# Patient Record
Sex: MALE | Race: WHITE | NOT HISPANIC OR LATINO | URBAN - METROPOLITAN AREA
[De-identification: names, ages, dates, MRNs, and addresses within clinical notes are randomized per-mention and may not be internally consistent; named-entity substitution may affect disease eponyms.]

---

## 2020-12-07 ENCOUNTER — PREPPED CHART (OUTPATIENT)
Dept: URBAN - METROPOLITAN AREA CLINIC 37 | Facility: CLINIC | Age: 70
End: 2020-12-07

## 2021-07-19 ENCOUNTER — FOLLOW UP (OUTPATIENT)
Dept: URBAN - METROPOLITAN AREA CLINIC 37 | Facility: CLINIC | Age: 71
End: 2021-07-19

## 2021-07-19 DIAGNOSIS — H25.11: ICD-10-CM

## 2021-07-19 DIAGNOSIS — H33.001: ICD-10-CM

## 2021-07-19 DIAGNOSIS — Z96.1: ICD-10-CM

## 2021-07-19 PROCEDURE — 92014 COMPRE OPH EXAM EST PT 1/>: CPT

## 2021-07-19 ASSESSMENT — KERATOMETRY
OD_AXISANGLE2_DEGREES: 085
OS_AXISANGLE2_DEGREES: 157
OD_K1POWER_DIOPTERS: 45.00
OD_AXISANGLE_DEGREES: 175
OS_K2POWER_DIOPTERS: 45.50
OD_K2POWER_DIOPTERS: 46.00
OS_K1POWER_DIOPTERS: 45.25
OS_AXISANGLE_DEGREES: 67

## 2021-07-19 ASSESSMENT — VISUAL ACUITY
OS_SC: 20/25-1
OD_CC: 20/25-2

## 2021-07-19 ASSESSMENT — TONOMETRY
OD_IOP_MMHG: 17.0
OS_IOP_MMHG: 19.0

## 2021-10-26 ENCOUNTER — COMPLETE SKIN EXAM (OUTPATIENT)
Dept: URBAN - METROPOLITAN AREA CLINIC 24 | Facility: CLINIC | Age: 71
Setting detail: DERMATOLOGY
End: 2021-10-26

## 2021-10-26 DIAGNOSIS — C44.629 SQUAMOUS CELL CARCINOMA OF SKIN OF LEFT UPPER LIMB, INCLUDING SHOULDER: ICD-10-CM

## 2021-10-26 PROCEDURE — 99213 OFFICE O/P EST LOW 20 MIN: CPT

## 2022-01-31 ENCOUNTER — ESTABLISHED COMPREHENSIVE EXAM (OUTPATIENT)
Dept: URBAN - METROPOLITAN AREA CLINIC 37 | Facility: CLINIC | Age: 72
End: 2022-01-31

## 2022-01-31 DIAGNOSIS — H33.001: ICD-10-CM

## 2022-01-31 DIAGNOSIS — H25.11: ICD-10-CM

## 2022-01-31 DIAGNOSIS — Z96.1: ICD-10-CM

## 2022-01-31 PROCEDURE — 92014 COMPRE OPH EXAM EST PT 1/>: CPT

## 2022-01-31 ASSESSMENT — KERATOMETRY
OS_K1POWER_DIOPTERS: 45.25
OD_AXISANGLE2_DEGREES: 87
OS_AXISANGLE_DEGREES: 66
OD_AXISANGLE_DEGREES: 177
OS_K2POWER_DIOPTERS: 45.50
OD_K1POWER_DIOPTERS: 45.00
OD_K2POWER_DIOPTERS: 46.25
OS_AXISANGLE2_DEGREES: 156

## 2022-01-31 ASSESSMENT — VISUAL ACUITY
OS_SC: 20/25-1
OU_CC: 20/25
OD_PH: 20/70-1
OD_CC: 20/400

## 2022-01-31 ASSESSMENT — TONOMETRY
OS_IOP_MMHG: 17
OD_IOP_MMHG: 17

## 2022-02-28 ENCOUNTER — CATARACT CONSULTATION (OUTPATIENT)
Dept: URBAN - METROPOLITAN AREA CLINIC 47 | Facility: CLINIC | Age: 72
End: 2022-02-28

## 2022-02-28 DIAGNOSIS — H25.11: ICD-10-CM

## 2022-02-28 PROCEDURE — 92014 COMPRE OPH EXAM EST PT 1/>: CPT

## 2022-02-28 ASSESSMENT — KERATOMETRY
OS_K2POWER_DIOPTERS: 45.50
OD_K2POWER_DIOPTERS: 46.25
OS_AXISANGLE2_DEGREES: 156
OS_AXISANGLE_DEGREES: 66
OS_K1POWER_DIOPTERS: 45.25
OD_K1POWER_DIOPTERS: 45.00
OD_AXISANGLE_DEGREES: 177
OD_AXISANGLE2_DEGREES: 87

## 2022-02-28 ASSESSMENT — VISUAL ACUITY
OU_CC: 20/20
OD_PH: 20/60
OS_SC: 20/25
OD_CC: 20/80

## 2022-04-01 ENCOUNTER — DIAGNOSTICS ONLY (OUTPATIENT)
Dept: URBAN - METROPOLITAN AREA CLINIC 47 | Facility: CLINIC | Age: 72
End: 2022-04-01

## 2022-04-01 DIAGNOSIS — H25.11: ICD-10-CM

## 2022-04-01 PROCEDURE — 92136 OPHTHALMIC BIOMETRY: CPT

## 2022-04-01 ASSESSMENT — KERATOMETRY
OS_AXISANGLE2_DEGREES: 156
OS_AXISANGLE_DEGREES: 66
OD_AXISANGLE2_DEGREES: 87
OS_K1POWER_DIOPTERS: 45.25
OD_AXISANGLE_DEGREES: 177
OD_K2POWER_DIOPTERS: 46.25
OD_K1POWER_DIOPTERS: 45.00
OS_K2POWER_DIOPTERS: 45.50

## 2022-04-06 ENCOUNTER — SURGERY/PROCEDURE (OUTPATIENT)
Dept: SURGICAL CENTER 2 | Facility: SURGICAL CENTER | Age: 72
End: 2022-04-06

## 2022-04-06 DIAGNOSIS — H25.11: ICD-10-CM

## 2022-04-06 PROCEDURE — 66984 XCAPSL CTRC RMVL W/O ECP: CPT

## 2022-04-07 ENCOUNTER — 1 DAY POST-OP (OUTPATIENT)
Dept: URBAN - METROPOLITAN AREA CLINIC 47 | Facility: CLINIC | Age: 72
End: 2022-04-07

## 2022-04-07 DIAGNOSIS — Z96.1: ICD-10-CM

## 2022-04-07 PROCEDURE — 99024 POSTOP FOLLOW-UP VISIT: CPT

## 2022-04-07 ASSESSMENT — KERATOMETRY
OS_AXISANGLE2_DEGREES: 156
OS_AXISANGLE_DEGREES: 66
OD_K1POWER_DIOPTERS: 45.00
OS_K2POWER_DIOPTERS: 45.50
OS_K2POWER_DIOPTERS: 45.50
OD_K1POWER_DIOPTERS: 45.00
OS_AXISANGLE_DEGREES: 66
OS_K1POWER_DIOPTERS: 45.25
OS_AXISANGLE2_DEGREES: 156
OD_AXISANGLE2_DEGREES: 092
OD_K1POWER_DIOPTERS: 44.75
OD_AXISANGLE_DEGREES: 2
OS_K1POWER_DIOPTERS: 45.25
OD_AXISANGLE2_DEGREES: 092
OD_AXISANGLE2_DEGREES: 87
OD_AXISANGLE2_DEGREES: 87
OD_K2POWER_DIOPTERS: 46.25
OD_K1POWER_DIOPTERS: 44.75
OD_K2POWER_DIOPTERS: 46.25
OD_K2POWER_DIOPTERS: 45.50
OD_AXISANGLE_DEGREES: 177
OD_AXISANGLE_DEGREES: 177
OD_AXISANGLE_DEGREES: 2
OD_K2POWER_DIOPTERS: 45.50

## 2022-04-07 ASSESSMENT — VISUAL ACUITY
OD_SC: 20/20
OU_SC: J1

## 2022-04-07 ASSESSMENT — TONOMETRY: OD_IOP_MMHG: 23

## 2022-07-11 ENCOUNTER — FOLLOW UP (OUTPATIENT)
Dept: URBAN - METROPOLITAN AREA CLINIC 37 | Facility: CLINIC | Age: 72
End: 2022-07-11

## 2022-07-11 DIAGNOSIS — H33.001: ICD-10-CM

## 2022-07-11 DIAGNOSIS — Z96.1: ICD-10-CM

## 2022-07-11 PROCEDURE — 92014 COMPRE OPH EXAM EST PT 1/>: CPT

## 2022-07-11 ASSESSMENT — KERATOMETRY
OD_K2POWER_DIOPTERS: 46.25
OS_AXISANGLE_DEGREES: 66
OS_K1POWER_DIOPTERS: 45.00
OD_AXISANGLE_DEGREES: 2
OD_AXISANGLE_DEGREES: 177
OD_AXISANGLE2_DEGREES: 87
OD_AXISANGLE2_DEGREES: 092
OD_AXISANGLE2_DEGREES: 80
OD_K2POWER_DIOPTERS: 45.50
OD_AXISANGLE_DEGREES: 170
OS_AXISANGLE_DEGREES: 79
OS_K1POWER_DIOPTERS: 45.25
OD_K1POWER_DIOPTERS: 44.75
OS_AXISANGLE2_DEGREES: 169
OD_K1POWER_DIOPTERS: 45.00
OS_AXISANGLE2_DEGREES: 156
OS_K2POWER_DIOPTERS: 45.50

## 2022-07-11 ASSESSMENT — TONOMETRY
OD_IOP_MMHG: 17
OS_IOP_MMHG: 18

## 2022-07-11 ASSESSMENT — VISUAL ACUITY
OS_PH: 20/25+2
OD_SC: 20/80+1
OU_SC: 20/30
OD_PH: 20/30+1
OS_SC: 20/40

## 2022-10-27 ENCOUNTER — APPOINTMENT (OUTPATIENT)
Dept: URBAN - METROPOLITAN AREA CLINIC 193 | Age: 72
Setting detail: DERMATOLOGY
End: 2022-11-03

## 2022-10-27 DIAGNOSIS — L57.8 OTHER SKIN CHANGES DUE TO CHRONIC EXPOSURE TO NONIONIZING RADIATION: ICD-10-CM

## 2022-10-27 DIAGNOSIS — B07.8 OTHER VIRAL WARTS: ICD-10-CM

## 2022-10-27 DIAGNOSIS — Z71.89 OTHER SPECIFIED COUNSELING: ICD-10-CM

## 2022-10-27 DIAGNOSIS — L82.1 OTHER SEBORRHEIC KERATOSIS: ICD-10-CM

## 2022-10-27 DIAGNOSIS — L81.4 OTHER MELANIN HYPERPIGMENTATION: ICD-10-CM

## 2022-10-27 DIAGNOSIS — D22 MELANOCYTIC NEVI: ICD-10-CM

## 2022-10-27 DIAGNOSIS — Z85.828 PERSONAL HISTORY OF OTHER MALIGNANT NEOPLASM OF SKIN: ICD-10-CM

## 2022-10-27 PROBLEM — D22.5 MELANOCYTIC NEVI OF TRUNK: Status: ACTIVE | Noted: 2022-10-27

## 2022-10-27 PROCEDURE — OTHER SHAVE REMOVAL (NO PATHOLOGY): OTHER

## 2022-10-27 PROCEDURE — 11305 SHAVE SKIN LESION 0.5 CM/<: CPT

## 2022-10-27 PROCEDURE — 99213 OFFICE O/P EST LOW 20 MIN: CPT | Mod: 25

## 2022-10-27 PROCEDURE — OTHER COUNSELING: OTHER

## 2022-10-27 ASSESSMENT — LOCATION DETAILED DESCRIPTION DERM
LOCATION DETAILED: LEFT MEDIAL BREAST 8-9:00 REGION
LOCATION DETAILED: MIDDLE STERNUM
LOCATION DETAILED: EPIGASTRIC SKIN
LOCATION DETAILED: RIGHT MEDIAL SUPERIOR CHEST
LOCATION DETAILED: LEFT CENTRAL POSTAURICULAR SKIN

## 2022-10-27 ASSESSMENT — LOCATION ZONE DERM
LOCATION ZONE: TRUNK
LOCATION ZONE: SCALP

## 2022-10-27 ASSESSMENT — LOCATION SIMPLE DESCRIPTION DERM
LOCATION SIMPLE: CHEST
LOCATION SIMPLE: LEFT BREAST
LOCATION SIMPLE: ABDOMEN
LOCATION SIMPLE: SCALP

## 2022-10-27 NOTE — PROCEDURE: SHAVE REMOVAL (NO PATHOLOGY)
Medical Necessity Information: It is in your best interest to select a reason for this procedure from the list below. All of these items fulfill various CMS LCD requirements except the new and changing color options.
Include Z78.9 (Other Specified Conditions Influencing Health Status) As An Associated Diagnosis?: No
Medical Necessity Clause: This procedure was medically necessary because the lesion that was treated was:
Detail Level: Detailed
Size Of Lesion In Cm: 0.5
X Size Of Lesion In Cm (Optional): 0
Anesthesia Type: 0.5% lidocaine with 1:200,000 epinephrine and a 1:10 solution of 8.4% sodium bicarbonate
Hemostasis: Electrocautery and Aluminum Chloride
Wound Care: Petrolatum
Consent was obtained from the patient. The risks and benefits to therapy were discussed in detail. Specifically, the risks of infection, scarring, bleeding, prolonged wound healing, incomplete removal, allergy to anesthesia, nerve injury and recurrence were addressed. Prior to the procedure, the treatment site was clearly identified and confirmed by the patient. All components of Universal Protocol/PAUSE Rule completed.
Post-Care Instructions: I reviewed with the patient in detail post-care instructions. Patient is to keep the biopsy site dry overnight, and then apply bacitracin twice daily until healed. Patient may apply hydrogen peroxide soaks to remove any crusting.

## 2023-02-06 ENCOUNTER — FOLLOW UP (OUTPATIENT)
Dept: URBAN - METROPOLITAN AREA CLINIC 37 | Facility: CLINIC | Age: 73
End: 2023-02-06

## 2023-02-06 DIAGNOSIS — H33.001: ICD-10-CM

## 2023-02-06 DIAGNOSIS — Z96.1: ICD-10-CM

## 2023-02-06 PROCEDURE — 92014 COMPRE OPH EXAM EST PT 1/>: CPT

## 2023-02-06 ASSESSMENT — KERATOMETRY
OS_AXISANGLE_DEGREES: 79
OD_AXISANGLE2_DEGREES: 80
OS_K2POWER_DIOPTERS: 45.50
OS_AXISANGLE2_DEGREES: 169
OD_K2POWER_DIOPTERS: 46.25
OD_K1POWER_DIOPTERS: 45.00
OS_K1POWER_DIOPTERS: 45.00
OD_AXISANGLE_DEGREES: 170

## 2023-02-06 ASSESSMENT — VISUAL ACUITY
OS_SC: 20/20
OD_PH: 20/40
OD_SC: 20/60-1

## 2023-08-09 ENCOUNTER — FOLLOW UP (OUTPATIENT)
Dept: URBAN - METROPOLITAN AREA CLINIC 37 | Facility: CLINIC | Age: 73
End: 2023-08-09

## 2023-08-09 DIAGNOSIS — H33.001: ICD-10-CM

## 2023-08-09 DIAGNOSIS — Z96.1: ICD-10-CM

## 2023-08-09 PROCEDURE — 92014 COMPRE OPH EXAM EST PT 1/>: CPT

## 2023-08-09 ASSESSMENT — TONOMETRY
OD_IOP_MMHG: 13
OS_IOP_MMHG: 16

## 2023-08-09 ASSESSMENT — VISUAL ACUITY
OS_SC: 20/20
OD_SC: 20/30+1

## 2023-09-19 ENCOUNTER — APPOINTMENT (OUTPATIENT)
Dept: URBAN - METROPOLITAN AREA CLINIC 193 | Age: 73
Setting detail: DERMATOLOGY
End: 2023-09-19

## 2023-09-19 DIAGNOSIS — L82.1 OTHER SEBORRHEIC KERATOSIS: ICD-10-CM

## 2023-09-19 DIAGNOSIS — D485 NEOPLASM OF UNCERTAIN BEHAVIOR OF SKIN: ICD-10-CM

## 2023-09-19 DIAGNOSIS — L57.0 ACTINIC KERATOSIS: ICD-10-CM

## 2023-09-19 DIAGNOSIS — D22 MELANOCYTIC NEVI: ICD-10-CM

## 2023-09-19 DIAGNOSIS — Z71.89 OTHER SPECIFIED COUNSELING: ICD-10-CM

## 2023-09-19 DIAGNOSIS — L57.8 OTHER SKIN CHANGES DUE TO CHRONIC EXPOSURE TO NONIONIZING RADIATION: ICD-10-CM

## 2023-09-19 DIAGNOSIS — Z85.828 PERSONAL HISTORY OF OTHER MALIGNANT NEOPLASM OF SKIN: ICD-10-CM

## 2023-09-19 DIAGNOSIS — L81.4 OTHER MELANIN HYPERPIGMENTATION: ICD-10-CM

## 2023-09-19 PROBLEM — D22.5 MELANOCYTIC NEVI OF TRUNK: Status: ACTIVE | Noted: 2023-09-19

## 2023-09-19 PROBLEM — D48.5 NEOPLASM OF UNCERTAIN BEHAVIOR OF SKIN: Status: ACTIVE | Noted: 2023-09-19

## 2023-09-19 PROCEDURE — OTHER SHAVE REMOVAL: OTHER

## 2023-09-19 PROCEDURE — OTHER MIPS QUALITY: OTHER

## 2023-09-19 PROCEDURE — 11302 SHAVE SKIN LESION 1.1-2.0 CM: CPT

## 2023-09-19 PROCEDURE — 17000 DESTRUCT PREMALG LESION: CPT | Mod: 59

## 2023-09-19 PROCEDURE — 99213 OFFICE O/P EST LOW 20 MIN: CPT | Mod: 25

## 2023-09-19 PROCEDURE — 17003 DESTRUCT PREMALG LES 2-14: CPT

## 2023-09-19 PROCEDURE — OTHER COUNSELING: OTHER

## 2023-09-19 PROCEDURE — OTHER LIQUID NITROGEN: OTHER

## 2023-09-19 ASSESSMENT — LOCATION ZONE DERM
LOCATION ZONE: FACE
LOCATION ZONE: TRUNK
LOCATION ZONE: ARM

## 2023-09-19 ASSESSMENT — LOCATION DETAILED DESCRIPTION DERM
LOCATION DETAILED: EPIGASTRIC SKIN
LOCATION DETAILED: RIGHT LATERAL INFERIOR CHEST
LOCATION DETAILED: RIGHT MEDIAL SUPERIOR CHEST
LOCATION DETAILED: LEFT POSTERIOR SHOULDER
LOCATION DETAILED: RIGHT LATERAL FOREHEAD
LOCATION DETAILED: LEFT MEDIAL BREAST 8-9:00 REGION
LOCATION DETAILED: MIDDLE STERNUM
LOCATION DETAILED: RIGHT INFERIOR TEMPLE

## 2023-09-19 ASSESSMENT — LOCATION SIMPLE DESCRIPTION DERM
LOCATION SIMPLE: ABDOMEN
LOCATION SIMPLE: CHEST
LOCATION SIMPLE: RIGHT FOREHEAD
LOCATION SIMPLE: LEFT SHOULDER
LOCATION SIMPLE: LEFT BREAST
LOCATION SIMPLE: RIGHT TEMPLE

## 2023-09-19 NOTE — PROCEDURE: LIQUID NITROGEN
Detail Level: Detailed
Application Tool (Optional): Cry-AC
Show Applicator Variable?: Yes
Duration Of Freeze Thaw-Cycle (Seconds): 3
Post-Care Instructions: I reviewed with the patient in detail post-care instructions. Patient is to wear sunprotection, and avoid picking at any of the treated lesions. Pt may apply Vaseline to crusted or scabbing areas.
Render Note In Bullet Format When Appropriate: No
Number Of Freeze-Thaw Cycles: 2 freeze-thaw cycles
Consent: The patient's consent was obtained including but not limited to risks of crusting, scabbing, blistering, scarring, darker or lighter pigmentary change, recurrence, incomplete removal and infection.

## 2023-09-19 NOTE — PROCEDURE: SHAVE REMOVAL
Medical Necessity Information: It is in your best interest to select a reason for this procedure from the list below. All of these items fulfill various CMS LCD requirements except the new and changing color options.
Medical Necessity Clause: This procedure was medically necessary because the lesion that was treated was:
Lab: [id number]
Lab Facility: [id number]
Detail Level: Detailed
Was A Bandage Applied: Yes
Size Of Lesion In Cm (Required): 1.4
Depth Of Shave: dermis
Biopsy Method: Dermablade
Anesthesia Type: 0.5% lidocaine with 1:200,000 epinephrine and a 1:10 solution of 4.2% sodium bicarbonate
Anesthesia Volume In Cc: 1
Hemostasis: Drysol
Wound Care: Petrolatum
Render Path Notes In Note?: No
Consent was obtained from the patient. The risks and benefits to therapy were discussed in detail. Specifically, the risks of infection, scarring, bleeding, prolonged wound healing, incomplete removal, allergy to anesthesia, nerve injury and recurrence were addressed. Prior to the procedure, the treatment site was clearly identified and confirmed by the patient. All components of Universal Protocol/PAUSE Rule completed.
Post-Care Instructions: I reviewed with the patient in detail post-care instructions. Patient is to keep the biopsy site dry overnight, and then apply bacitracin twice daily until healed. Patient may apply hydrogen peroxide soaks to remove any crusting.
Notification Instructions: Patient will be notified of pathology results. However, patient instructed to call the office if not contacted within 2 weeks.
Billing Type: Third-Party Bill

## 2023-09-19 NOTE — HPI: EVALUATION OF SKIN LESION(S)
Hpi Title: Evaluation of Skin Lesions
Additional History: Patient reports that he has a growth on the right chest area also states that he has a spot on the right temple area that has been rough and scaly nothing he uses helps with it.

## 2024-02-13 ENCOUNTER — FOLLOW UP (OUTPATIENT)
Dept: URBAN - METROPOLITAN AREA CLINIC 37 | Facility: CLINIC | Age: 74
End: 2024-02-13

## 2024-02-13 DIAGNOSIS — H33.001: ICD-10-CM

## 2024-02-13 DIAGNOSIS — Z96.1: ICD-10-CM

## 2024-02-13 DIAGNOSIS — H35.62: ICD-10-CM

## 2024-02-13 DIAGNOSIS — H26.491: ICD-10-CM

## 2024-02-13 PROCEDURE — 92014 COMPRE OPH EXAM EST PT 1/>: CPT

## 2024-02-13 PROCEDURE — 92250 FUNDUS PHOTOGRAPHY W/I&R: CPT

## 2024-02-13 ASSESSMENT — KERATOMETRY
OS_AXISANGLE_DEGREES: 159
OS_K1POWER_DIOPTERS: 45.25
OD_AXISANGLE_DEGREES: 174
OD_AXISANGLE2_DEGREES: 84
OD_K1POWER_DIOPTERS: 45.25
OD_K2POWER_DIOPTERS: 46.25
OS_AXISANGLE2_DEGREES: 69
OS_K2POWER_DIOPTERS: 45.50

## 2024-02-13 ASSESSMENT — VISUAL ACUITY
OD_SC: 20/80
OS_SC: 20/30
OD_PH: 20/50+1

## 2024-02-13 ASSESSMENT — TONOMETRY
OD_IOP_MMHG: 18
OS_IOP_MMHG: 18

## 2024-04-26 ENCOUNTER — PROBLEM (OUTPATIENT)
Dept: URBAN - METROPOLITAN AREA CLINIC 37 | Facility: CLINIC | Age: 74
End: 2024-04-26

## 2024-04-26 DIAGNOSIS — H33.001: ICD-10-CM

## 2024-04-26 DIAGNOSIS — Z96.1: ICD-10-CM

## 2024-04-26 DIAGNOSIS — H35.62: ICD-10-CM

## 2024-04-26 DIAGNOSIS — H26.491: ICD-10-CM

## 2024-04-26 DIAGNOSIS — H43.813: ICD-10-CM

## 2024-04-26 PROCEDURE — 92014 COMPRE OPH EXAM EST PT 1/>: CPT

## 2024-04-26 ASSESSMENT — KERATOMETRY
OS_K2POWER_DIOPTERS: 45.50
OD_AXISANGLE_DEGREES: 174
OD_AXISANGLE2_DEGREES: 84
OD_K1POWER_DIOPTERS: 45.25
OS_AXISANGLE_DEGREES: 159
OS_K1POWER_DIOPTERS: 45.25
OD_K2POWER_DIOPTERS: 46.25
OS_AXISANGLE2_DEGREES: 69

## 2024-04-26 ASSESSMENT — VISUAL ACUITY
OD_PH: 20/100
OU_SC: J2
OS_PH: 20/30+2
OS_SC: 20/60
OD_SC: 20/400

## 2024-04-26 ASSESSMENT — TONOMETRY
OS_IOP_MMHG: 18
OD_IOP_MMHG: 19

## 2024-06-04 ENCOUNTER — PROCEDURE ONLY (OUTPATIENT)
Dept: URBAN - METROPOLITAN AREA CLINIC 95 | Facility: CLINIC | Age: 74
End: 2024-06-04

## 2024-06-04 DIAGNOSIS — H26.491: ICD-10-CM

## 2024-06-04 PROCEDURE — 66821 AFTER CATARACT LASER SURGERY: CPT

## 2024-06-04 ASSESSMENT — TONOMETRY
OD_IOP_MMHG: 15
OS_IOP_MMHG: 15

## 2024-06-04 ASSESSMENT — KERATOMETRY
OS_AXISANGLE2_DEGREES: 69
OD_K1POWER_DIOPTERS: 45.25
OD_AXISANGLE2_DEGREES: 84
OS_K2POWER_DIOPTERS: 45.50
OD_AXISANGLE_DEGREES: 174
OD_K2POWER_DIOPTERS: 46.25
OS_AXISANGLE_DEGREES: 159
OS_K1POWER_DIOPTERS: 45.25

## 2024-06-04 ASSESSMENT — VISUAL ACUITY
OD_PH: 20/100-1
OS_SC: 20/50-1
OS_PH: 20/20
OD_SC: 20/200

## 2024-09-19 ENCOUNTER — APPOINTMENT (OUTPATIENT)
Dept: URBAN - METROPOLITAN AREA CLINIC 193 | Age: 74
Setting detail: DERMATOLOGY
End: 2024-09-23

## 2024-09-19 DIAGNOSIS — D22 MELANOCYTIC NEVI: ICD-10-CM

## 2024-09-19 DIAGNOSIS — Z85.828 PERSONAL HISTORY OF OTHER MALIGNANT NEOPLASM OF SKIN: ICD-10-CM

## 2024-09-19 DIAGNOSIS — L57.0 ACTINIC KERATOSIS: ICD-10-CM

## 2024-09-19 DIAGNOSIS — L57.8 OTHER SKIN CHANGES DUE TO CHRONIC EXPOSURE TO NONIONIZING RADIATION: ICD-10-CM

## 2024-09-19 DIAGNOSIS — L82.1 OTHER SEBORRHEIC KERATOSIS: ICD-10-CM

## 2024-09-19 DIAGNOSIS — L81.4 OTHER MELANIN HYPERPIGMENTATION: ICD-10-CM

## 2024-09-19 PROBLEM — D22.5 MELANOCYTIC NEVI OF TRUNK: Status: ACTIVE | Noted: 2024-09-19

## 2024-09-19 PROCEDURE — OTHER COUNSELING: OTHER

## 2024-09-19 PROCEDURE — 99213 OFFICE O/P EST LOW 20 MIN: CPT | Mod: 25

## 2024-09-19 PROCEDURE — OTHER LIQUID NITROGEN: OTHER

## 2024-09-19 PROCEDURE — 17000 DESTRUCT PREMALG LESION: CPT

## 2024-09-19 ASSESSMENT — LOCATION DETAILED DESCRIPTION DERM
LOCATION DETAILED: EPIGASTRIC SKIN
LOCATION DETAILED: RIGHT MEDIAL SUPERIOR CHEST
LOCATION DETAILED: LEFT PROXIMAL DORSAL FOREARM
LOCATION DETAILED: MIDDLE STERNUM
LOCATION DETAILED: LEFT MEDIAL BREAST 8-9:00 REGION

## 2024-09-19 ASSESSMENT — LOCATION SIMPLE DESCRIPTION DERM
LOCATION SIMPLE: LEFT BREAST
LOCATION SIMPLE: LEFT FOREARM
LOCATION SIMPLE: CHEST
LOCATION SIMPLE: ABDOMEN

## 2024-09-19 ASSESSMENT — LOCATION ZONE DERM
LOCATION ZONE: ARM
LOCATION ZONE: TRUNK

## 2024-09-19 NOTE — HPI: EVALUATION OF SKIN LESION(S)
How Severe Are Your Spot(S)?: moderate
Have Your Spot(S) Been Treated In The Past?: has not been treated
Hpi Title: Evaluation of Skin Lesions
Additional History: Patient presents today for a TBSE. HX of SCC. Patient would like all moles of be evaluated.\\n\\nConcerns are as follows - Scaly spots on the scalp.

## 2024-09-19 NOTE — PROCEDURE: LIQUID NITROGEN
Detail Level: Zone
Show Aperture Variable?: Yes
Consent: The patient's consent was obtained including but not limited to risks of crusting, scabbing, blistering, scarring, darker or lighter pigmentary change, recurrence, incomplete removal and infection.
Number Of Freeze-Thaw Cycles: 2 freeze-thaw cycles
Post-Care Instructions: I reviewed with the patient in detail post-care instructions. Patient is to wear sunprotection, and avoid picking at any of the treated lesions. Pt may apply Vaseline to crusted or scabbing areas.
Duration Of Freeze Thaw-Cycle (Seconds): 3
Render Note In Bullet Format When Appropriate: No
Application Tool (Optional): Cry-AC

## 2025-04-02 ENCOUNTER — 6 MONTH FOLLOW UP (OUTPATIENT)
Dept: URBAN - METROPOLITAN AREA CLINIC 37 | Facility: CLINIC | Age: 75
End: 2025-04-02

## 2025-04-02 DIAGNOSIS — H43.813: ICD-10-CM

## 2025-04-02 DIAGNOSIS — Z96.1: ICD-10-CM

## 2025-04-02 DIAGNOSIS — H33.001: ICD-10-CM

## 2025-04-02 PROCEDURE — 99214 OFFICE O/P EST MOD 30 MIN: CPT

## 2025-04-02 ASSESSMENT — KERATOMETRY
OD_K1POWER_DIOPTERS: 45.00
OS_AXISANGLE_DEGREES: 59
OD_K2POWER_DIOPTERS: 46.00
OD_AXISANGLE_DEGREES: 176
OD_AXISANGLE2_DEGREES: 86
OS_K2POWER_DIOPTERS: 45.50
OS_K1POWER_DIOPTERS: 45.25
OS_AXISANGLE2_DEGREES: 149

## 2025-04-02 ASSESSMENT — VISUAL ACUITY
OU_SC: 20/20
OD_SC: 20/30
OS_SC: 20/20
OU_SC: J1
OD_SC: J3

## 2025-04-02 ASSESSMENT — TONOMETRY
OS_IOP_MMHG: 20
OD_IOP_MMHG: 19